# Patient Record
Sex: MALE | Race: WHITE | NOT HISPANIC OR LATINO | URBAN - METROPOLITAN AREA
[De-identification: names, ages, dates, MRNs, and addresses within clinical notes are randomized per-mention and may not be internally consistent; named-entity substitution may affect disease eponyms.]

---

## 2021-10-20 ENCOUNTER — TELEPHONE (OUTPATIENT)
Dept: UROLOGY | Facility: CLINIC | Age: 71
End: 2021-10-20

## 2021-10-20 NOTE — TELEPHONE ENCOUNTER
[facility] Call Center    Phone Message    May a detailed message be left on voicemail: yes     Reason for Call: Appointment Intake    Referring Provider Name: n/a  Diagnosis and/or Symptoms: Pt's son Christian called in wanting to know when the first opening would be. Pt has gross hematuria. Please call Christian back to discuss      Action Taken: Message routed to:  [facility]): uro    Travel Screening: Not Applicable

## 2021-10-25 ENCOUNTER — TELEPHONE (OUTPATIENT)
Dept: UROLOGY | Facility: CLINIC | Age: 71
End: 2021-10-25

## 2021-10-25 NOTE — TELEPHONE ENCOUNTER
Please schedule this patient with Juanis Amanda on 10/29 at 10am for new video appointment for gross hematuria   Please take hold off when scheduled Nita De Jesus LPN Staff Nurse

## 2021-10-25 NOTE — TELEPHONE ENCOUNTER
[facility] Call Center    Phone Message    May a detailed message be left on voicemail: yes     Reason for Call: Appointment Intake    Referring Provider Name:   Diagnosis and/or Symptoms: Gross Hematuria    Action Taken: Message routed to:  [facility] Uro    Travel Screening: Not Applicable

## 2021-11-03 ENCOUNTER — TELEPHONE (OUTPATIENT)
Dept: UROLOGY | Facility: CLINIC | Age: 71
End: 2021-11-03

## 2021-11-08 ENCOUNTER — PRE VISIT (OUTPATIENT)
Dept: UROLOGY | Facility: CLINIC | Age: 71
End: 2021-11-08

## 2021-11-08 NOTE — TELEPHONE ENCOUNTER
Reason for visit: consult for gross hematuria      Dx/Hx/Sx: gross hematuria    Records/imaging/labs/orders: no records    At Rooming: video visit

## 2021-11-08 NOTE — TELEPHONE ENCOUNTER
Called patient several times left message regarding his appointment this Friday at 4pm video with wang De Jesus LPN Staff Nurse

## 2021-11-11 NOTE — TELEPHONE ENCOUNTER
Action 11/11/2021 JTV 10:00am   Action Taken CSS called patient. Patient did not . [facility] LVM for patient to return call to find out where medical records are located.      @ 1:01pm, CSS called patient's Son Christian's phone number, [phone]. CSS did not LVM because NM could not be confirmed.   [facility] tried calling patient's son Ector. Ector's VM has not been set up. [facility] was unable to LVM.  Message was sent to Urology staff to notify of no recs.

## 2021-11-12 ENCOUNTER — VIRTUAL VISIT (OUTPATIENT)
Dept: UROLOGY | Facility: CLINIC | Age: 71
End: 2021-11-12

## 2021-11-12 DIAGNOSIS — Z91.199 NO-SHOW FOR APPOINTMENT: Primary | ICD-10-CM

## 2021-11-12 PROCEDURE — 99207 PR NO BILLABLE SERVICE THIS VISIT: CPT | Performed by: NURSE PRACTITIONER

## 2021-11-12 NOTE — PROGRESS NOTES
Patient's primary number on file called and the person who answered stated that they had received several calls from [facility], but it was the wrong number and they were not the patient. I therefore called the number listed on file for the patient's son, Christian, but was also told that this was a wrong number. Will need to reschedule this visit for another day and update the contact information on file to be accurate. Will also need to obtain outside medical records, as attempts have been made to obtain these as well without success.     Juanis Amanda, CNP  Department of Urology

## 2021-11-12 NOTE — LETTER
11/12/2021       RE: Karthik Cote  [address] [address]     Dear Colleague,    Thank you for referring your patient, Karthik Cote, to the [facility] [place] [facility]. Please see a copy of my visit note below.    Patient's primary number on file called and the person who answered stated that they had received several calls from [facility], but it was the wrong number and they were not the patient. I therefore called the number listed on file for the patient's son, Christian, but was also told that this was a wrong number. Will need to reschedule this visit for another day and update the contact information on file to be accurate. Will also need to obtain outside medical records, as attempts have been made to obtain these as well without success.     Juanis Amanda CNP  Department of Urology         Again, thank you for allowing me to participate in the care of your patient.      Sincerely,    Juanis Amanda CNP

## 2021-11-12 NOTE — LETTER
Date:December 3, 2021      Patient was self referred, no letter generated. Do not send.         Health Information